# Patient Record
(demographics unavailable — no encounter records)

---

## 2025-05-02 NOTE — REVIEW OF SYSTEMS
[Negative] : Heme/Lymph [FreeTextEntry2] : Patient gained 9 lbs from prior visit [FreeTextEntry8] : Nocturia [de-identified] : RLE chronic pain/paresthesia   +Right foot drop   +Ataxia

## 2025-05-02 NOTE — HEALTH RISK ASSESSMENT
[Little interest or pleasure doing things] : 1) Little interest or pleasure doing things [Feeling down, depressed, or hopeless] : 2) Feeling down, depressed, or hopeless [0] : 2) Feeling down, depressed, or hopeless: Not at all (0) [PHQ-2 Negative - No further assessment needed] : PHQ-2 Negative - No further assessment needed [Time Spent: ___ Minutes] : I spent [unfilled] minutes performing a depression screening for this patient. [[id number]] : 0

## 2025-05-02 NOTE — PHYSICAL EXAM
[Normal Rate] : normal rate  [Regular Rhythm] : with a regular rhythm [Normal S1, S2] : normal S1 and S2 [Normal] : affect was normal and insight and judgment were intact [de-identified] : Normotensive   BMI: 26.45 [de-identified] : +Systolic murmer [de-identified] : Bilateral ankle edema  [de-identified] : RLE paresthesias uses cane/walker

## 2025-05-02 NOTE — REVIEW OF SYSTEMS
[Negative] : Heme/Lymph [FreeTextEntry2] : Patient gained 9 lbs from prior visit [FreeTextEntry8] : Nocturia [de-identified] : RLE chronic pain/paresthesia   +Right foot drop   +Ataxia

## 2025-05-02 NOTE — HISTORY OF PRESENT ILLNESS
[Spouse] : spouse [FreeTextEntry1] : Follow up visit [de-identified] : 84 yrs old male here with history of HTN X 35 yrs, prediabetes, gout since 30s, severe neuropathy RLE -takes IVIG unknown cause, hypothyroidism, CAD with stent, aortic stenosis TAVR 9/2022, glaucoma, hyperlipidemia for follow up visit and medication renewals. Patient has not been here for follow up in over 1 year. States sees several specialists such as cardiology, nephrology, ophthalmology and neurology very often. Will request records- have not received any records from specialists to date.  Update: No new falls IVIG every 6 weeks No new vaccines Has a home gym and does daily exercise and weights Allergies: Adhesive tape-rash  NKDA Medication: Reconciliation done today Allopurinol 300 mg QD, Plavix 75 mg QD, ASA 81 mg QD, Rosuvastatin 40 mg QD, Nifedipine ER 90 mg QD, Telmisartan 80 mg QD, levothyroxine 75 micrograms QD, probiotic, B12, Caltrate, B12, Metamucil 2 tablets QD Glaucoma drops: Timolol, Travopost, Simbrinza OU SH: Never smoker, Social ETOH, Marijuana none FH: Old age parents, gout grandfather Diet: Regular Exercise: Has home gym- daily use including cardio/weights.

## 2025-05-02 NOTE — HEALTH RISK ASSESSMENT
[Little interest or pleasure doing things] : 1) Little interest or pleasure doing things [Feeling down, depressed, or hopeless] : 2) Feeling down, depressed, or hopeless [0] : 2) Feeling down, depressed, or hopeless: Not at all (0) [PHQ-2 Negative - No further assessment needed] : PHQ-2 Negative - No further assessment needed [Time Spent: ___ Minutes] : I spent [unfilled] minutes performing a depression screening for this patient. [[id number]] : 0

## 2025-05-02 NOTE — PLAN
[FreeTextEntry1] : 82 yrs old male here for follow up visit. Recent history includes CAD/stent cardiac, and TAVR 9/2022, ongoing severe neuropathy RLE on IVIG - with last hospitalization for sepsis 1/2023 due to Port infection for IVIG. Port since discontinued. Patient has had no hospitalizations, urgent care or surgeries since last appt. No falls or injuries since last appt Medication reconciliation done, all medications and vitamins/supplements reviewed and updated in EHR. Renewals e-prescribed. NKDA, allergy to adhesive tape. Patient is a non-smoker.  No cognitive changes. Will send for records from: Cardiology: hx stent, CAD and TAVR Ophthalmology for glaucoma Neurology for IVIG injections every 6 weeks and severe RLE neuropathy Endocrinology for hypothyroidism Full labwork ordered including : CBC, CMP, lipid, TFTs, LFTs, A1c, magnesium, CK, urine screen, CRP/ESR, vitamin levels.  PSA discussed with patient and ordered. Preventive screening discussed with patient. Full CPE ordered to further discuss preventive screenings.  Patient will sign consent for specialist's notes - recent as well as testing done. Patient understands instructions and agrees with plan Will contact patient with results of all testing.

## 2025-05-02 NOTE — HISTORY OF PRESENT ILLNESS
[Spouse] : spouse [FreeTextEntry1] : Follow up visit [de-identified] : 84 yrs old male here with history of HTN X 35 yrs, prediabetes, gout since 30s, severe neuropathy RLE -takes IVIG unknown cause, hypothyroidism, CAD with stent, aortic stenosis TAVR 9/2022, glaucoma, hyperlipidemia for follow up visit and medication renewals. Patient has not been here for follow up in over 1 year. States sees several specialists such as cardiology, nephrology, ophthalmology and neurology very often. Will request records- have not received any records from specialists to date.  Update: No new falls IVIG every 6 weeks No new vaccines Has a home gym and does daily exercise and weights Allergies: Adhesive tape-rash  NKDA Medication: Reconciliation done today Allopurinol 300 mg QD, Plavix 75 mg QD, ASA 81 mg QD, Rosuvastatin 40 mg QD, Nifedipine ER 90 mg QD, Telmisartan 80 mg QD, levothyroxine 75 micrograms QD, probiotic, B12, Caltrate, B12, Metamucil 2 tablets QD Glaucoma drops: Timolol, Travopost, Simbrinza OU SH: Never smoker, Social ETOH, Marijuana none FH: Old age parents, gout grandfather Diet: Regular Exercise: Has home gym- daily use including cardio/weights.

## 2025-05-02 NOTE — PHYSICAL EXAM
[Normal Rate] : normal rate  [Regular Rhythm] : with a regular rhythm [Normal S1, S2] : normal S1 and S2 [Normal] : affect was normal and insight and judgment were intact [de-identified] : Normotensive   BMI: 26.45 [de-identified] : +Systolic murmer [de-identified] : Bilateral ankle edema  [de-identified] : RLE paresthesias uses cane/walker